# Patient Record
Sex: FEMALE
[De-identification: names, ages, dates, MRNs, and addresses within clinical notes are randomized per-mention and may not be internally consistent; named-entity substitution may affect disease eponyms.]

---

## 2020-09-17 ENCOUNTER — TRANSCRIPTION ENCOUNTER (OUTPATIENT)
Age: 20
End: 2020-09-17

## 2021-08-19 ENCOUNTER — APPOINTMENT (OUTPATIENT)
Dept: CARDIOLOGY | Facility: CLINIC | Age: 21
End: 2021-08-19

## 2021-08-19 DIAGNOSIS — K05.10 CHRONIC GINGIVITIS, PLAQUE INDUCED: ICD-10-CM

## 2021-08-19 DIAGNOSIS — R59.1 GENERALIZED ENLARGED LYMPH NODES: ICD-10-CM

## 2021-08-19 RX ORDER — AMOXICILLIN AND CLAVULANATE POTASSIUM 500; 125 MG/1; MG/1
500-125 TABLET, FILM COATED ORAL
Qty: 14 | Refills: 0 | Status: ACTIVE | COMMUNITY
Start: 2021-08-19 | End: 1900-01-01

## 2021-08-20 RX ORDER — CEPHALEXIN 500 MG/1
500 TABLET ORAL 3 TIMES DAILY
Qty: 15 | Refills: 0 | Status: ACTIVE | COMMUNITY
Start: 2021-08-20 | End: 1900-01-01

## 2021-09-01 ENCOUNTER — NON-APPOINTMENT (OUTPATIENT)
Age: 21
End: 2021-09-01

## 2021-09-01 ENCOUNTER — APPOINTMENT (OUTPATIENT)
Dept: OPHTHALMOLOGY | Facility: CLINIC | Age: 21
End: 2021-09-01
Payer: MEDICAID

## 2021-09-01 PROCEDURE — 92004 COMPRE OPH EXAM NEW PT 1/>: CPT

## 2021-11-03 DIAGNOSIS — N39.0 URINARY TRACT INFECTION, SITE NOT SPECIFIED: ICD-10-CM

## 2021-11-03 RX ORDER — NITROFURANTOIN (MONOHYDRATE/MACROCRYSTALS) 25; 75 MG/1; MG/1
100 CAPSULE ORAL TWICE DAILY
Qty: 14 | Refills: 0 | Status: ACTIVE | COMMUNITY
Start: 2021-11-03 | End: 1900-01-01

## 2022-08-22 ENCOUNTER — TRANSCRIPTION ENCOUNTER (OUTPATIENT)
Age: 22
End: 2022-08-22

## 2022-08-22 ENCOUNTER — LABORATORY RESULT (OUTPATIENT)
Age: 22
End: 2022-08-22

## 2022-08-22 ENCOUNTER — APPOINTMENT (OUTPATIENT)
Dept: CARDIOLOGY | Facility: CLINIC | Age: 22
End: 2022-08-22

## 2022-08-22 VITALS — HEART RATE: 110 BPM

## 2022-08-22 DIAGNOSIS — U07.1 COVID-19: ICD-10-CM

## 2022-08-22 DIAGNOSIS — R53.83 OTHER FATIGUE: ICD-10-CM

## 2022-08-22 DIAGNOSIS — R00.0 TACHYCARDIA, UNSPECIFIED: ICD-10-CM

## 2022-08-22 DIAGNOSIS — Z78.9 OTHER SPECIFIED HEALTH STATUS: ICD-10-CM

## 2022-08-22 DIAGNOSIS — R06.09 OTHER FORMS OF DYSPNEA: ICD-10-CM

## 2022-08-22 PROCEDURE — 36415 COLL VENOUS BLD VENIPUNCTURE: CPT

## 2022-08-22 PROCEDURE — 99204 OFFICE O/P NEW MOD 45 MIN: CPT | Mod: 25

## 2022-08-22 PROCEDURE — 93000 ELECTROCARDIOGRAM COMPLETE: CPT

## 2022-08-23 VITALS
TEMPERATURE: 99.2 F | WEIGHT: 160 LBS | DIASTOLIC BLOOD PRESSURE: 85 MMHG | OXYGEN SATURATION: 98 % | SYSTOLIC BLOOD PRESSURE: 116 MMHG | HEART RATE: 93 BPM

## 2022-08-23 PROBLEM — Z78.9 NEVER SMOKED TOBACCO: Status: ACTIVE | Noted: 2022-08-22

## 2022-08-23 PROBLEM — R53.83 FATIGUE: Status: ACTIVE | Noted: 2022-08-23

## 2022-08-23 LAB
ALBUMIN SERPL ELPH-MCNC: 5.1 G/DL
ALP BLD-CCNC: 73 U/L
ALT SERPL-CCNC: 17 U/L
ANION GAP SERPL CALC-SCNC: 14 MMOL/L
AST SERPL-CCNC: 17 U/L
BASOPHILS # BLD AUTO: 0.06 K/UL
BASOPHILS NFR BLD AUTO: 0.6 %
BILIRUB SERPL-MCNC: 0.2 MG/DL
BUN SERPL-MCNC: 8 MG/DL
CALCIUM SERPL-MCNC: 9.9 MG/DL
CHLORIDE SERPL-SCNC: 106 MMOL/L
CO2 SERPL-SCNC: 21 MMOL/L
CREAT SERPL-MCNC: 0.84 MG/DL
CRP SERPL-MCNC: <3 MG/L
EGFR: 100 ML/MIN/1.73M2
EOSINOPHIL # BLD AUTO: 0.11 K/UL
EOSINOPHIL NFR BLD AUTO: 1 %
ERYTHROCYTE [SEDIMENTATION RATE] IN BLOOD BY WESTERGREN METHOD: 34 MM/HR
GLUCOSE SERPL-MCNC: 112 MG/DL
HCT VFR BLD CALC: 41.1 %
HGB BLD-MCNC: 13.9 G/DL
IMM GRANULOCYTES NFR BLD AUTO: 0.6 %
LYMPHOCYTES # BLD AUTO: 3.71 K/UL
LYMPHOCYTES NFR BLD AUTO: 34.2 %
MAN DIFF?: NORMAL
MCHC RBC-ENTMCNC: 28.9 PG
MCHC RBC-ENTMCNC: 33.8 GM/DL
MCV RBC AUTO: 85.4 FL
MONOCYTES # BLD AUTO: 0.64 K/UL
MONOCYTES NFR BLD AUTO: 5.9 %
NEUTROPHILS # BLD AUTO: 6.26 K/UL
NEUTROPHILS NFR BLD AUTO: 57.7 %
NT-PROBNP SERPL-MCNC: 22 PG/ML
PLATELET # BLD AUTO: 354 K/UL
POTASSIUM SERPL-SCNC: 3.8 MMOL/L
PROT SERPL-MCNC: 8.2 G/DL
RBC # BLD: 4.81 M/UL
RBC # FLD: 13.2 %
SODIUM SERPL-SCNC: 142 MMOL/L
TSH SERPL-ACNC: 39.4 UIU/ML
WBC # FLD AUTO: 10.84 K/UL

## 2022-08-23 NOTE — HISTORY OF PRESENT ILLNESS
[FreeTextEntry1] : August 22, 2022.  21-year-old otherwise healthy woman who came down with COVID recently.  Symptoms started August 8, she tested + August 9, and symptoms resolved around August 13 or 14.  She has been back to work and doing fine but she has noticed that her heart rate is always a little fast and she is a little short of breath compared to pre-COVID.  Ectopy or presyncope.  No true limitation in terms of her shortness of breath.  No prior cardiac or pulmonary history.  Never smoked.  No family history of coronary artery disease.  She has mentioned that she has gained 30 pounds in the last year which she attributed to being on birth control pills

## 2022-08-23 NOTE — PHYSICAL EXAM
[Well Developed] : well developed [Well Nourished] : well nourished [No Acute Distress] : no acute distress [Normal Conjunctiva] : normal conjunctiva [Normal Venous Pressure] : normal venous pressure [No Carotid Bruit] : no carotid bruit [Normal S1, S2] : normal S1, S2 [No Murmur] : no murmur [No Rub] : no rub [No Gallop] : no gallop [Clear Lung Fields] : clear lung fields [Good Air Entry] : good air entry [No Respiratory Distress] : no respiratory distress  [Soft] : abdomen soft [Non Tender] : non-tender [No Masses/organomegaly] : no masses/organomegaly [Normal Bowel Sounds] : normal bowel sounds [Normal Gait] : normal gait [No Edema] : no edema [No Cyanosis] : no cyanosis [No Clubbing] : no clubbing [No Varicosities] : no varicosities [No Rash] : no rash [No Skin Lesions] : no skin lesions [Moves all extremities] : moves all extremities [No Focal Deficits] : no focal deficits [Normal Speech] : normal speech [Alert and Oriented] : alert and oriented [Normal memory] : normal memory [de-identified] : Diffusely enlarged thyroid with no nodules

## 2022-08-23 NOTE — REVIEW OF SYSTEMS
[Weight Gain (___ Lbs)] : [unfilled] ~Ulb weight gain [Dyspnea on exertion] : dyspnea during exertion [Chest Discomfort] : no chest discomfort [Lower Ext Edema] : no extremity edema [Palpitations] : palpitations [Orthopnea] : no orthopnea [PND] : no PND [Syncope] : no syncope [Cough] : no cough [Dizziness] : no dizziness [Depression] : no depression [Anxiety] : no anxiety [Under Stress] : not under stress [Negative] : Heme/Lymph

## 2022-08-23 NOTE — PHYSICAL EXAM
[Well Developed] : well developed [Well Nourished] : well nourished [No Acute Distress] : no acute distress [Normal Conjunctiva] : normal conjunctiva [Normal Venous Pressure] : normal venous pressure [No Carotid Bruit] : no carotid bruit [Normal S1, S2] : normal S1, S2 [No Murmur] : no murmur [No Rub] : no rub [No Gallop] : no gallop [Clear Lung Fields] : clear lung fields [Good Air Entry] : good air entry [No Respiratory Distress] : no respiratory distress  [Soft] : abdomen soft [Non Tender] : non-tender [No Masses/organomegaly] : no masses/organomegaly [Normal Bowel Sounds] : normal bowel sounds [Normal Gait] : normal gait [No Edema] : no edema [No Cyanosis] : no cyanosis [No Clubbing] : no clubbing [No Varicosities] : no varicosities [No Rash] : no rash [No Skin Lesions] : no skin lesions [Moves all extremities] : moves all extremities [No Focal Deficits] : no focal deficits [Normal Speech] : normal speech [Alert and Oriented] : alert and oriented [Normal memory] : normal memory [de-identified] : Diffusely enlarged thyroid with no nodules

## 2022-08-24 ENCOUNTER — NON-APPOINTMENT (OUTPATIENT)
Age: 22
End: 2022-08-24

## 2022-09-21 ENCOUNTER — LABORATORY RESULT (OUTPATIENT)
Age: 22
End: 2022-09-21

## 2022-09-22 LAB — TSH SERPL-ACNC: 4.49 UIU/ML

## 2022-09-30 ENCOUNTER — APPOINTMENT (OUTPATIENT)
Dept: OPHTHALMOLOGY | Facility: CLINIC | Age: 22
End: 2022-09-30

## 2022-09-30 ENCOUNTER — NON-APPOINTMENT (OUTPATIENT)
Age: 22
End: 2022-09-30

## 2022-09-30 PROCEDURE — 92014 COMPRE OPH EXAM EST PT 1/>: CPT

## 2022-10-26 ENCOUNTER — APPOINTMENT (OUTPATIENT)
Dept: ENDOCRINOLOGY | Facility: CLINIC | Age: 22
End: 2022-10-26

## 2022-10-26 VITALS
OXYGEN SATURATION: 99 % | DIASTOLIC BLOOD PRESSURE: 88 MMHG | HEART RATE: 117 BPM | TEMPERATURE: 98.1 F | SYSTOLIC BLOOD PRESSURE: 110 MMHG | BODY MASS INDEX: 29.81 KG/M2 | WEIGHT: 162 LBS | HEIGHT: 62 IN

## 2022-10-26 PROCEDURE — 99204 OFFICE O/P NEW MOD 45 MIN: CPT

## 2022-10-26 NOTE — HISTORY OF PRESENT ILLNESS
[FreeTextEntry1] : CHIEF COMPLAINT: Hypothyroidism\par \par REFERRED BY: Dr. Stewart Rivera \par \par Available prior medical records reviewed. \par \par HISTORY OF PRESENTING ILLNESS:\par The patient is a 21-year-old female here for evaluation of hypothyroidism.  She was first diagnosed with hypothyroidism after she had a COVID-19 infection in August 2022, and she started to notice symptoms of palpitations and a measurable elevated heart rate.  She was noted to have an elevated TSH of 39 and a low free T4 of 0.8, and was started on Synthroid 100 mcg daily.  Repeat levels from September 2022 have improved from prior.\par Currently she reports issues with weight gain, she has gained about 30 pounds over the last 2 years.  She occasionally still gets palpitations.  Feels that she has good energy, notices ongoing hair fall.  Has not lost any weight since starting levothyroxine.  Denies any violaceous striae on her abdomen, no facial plethora or supraclavicular fat pads.\par \par Hypo/Hyperthyroid symptoms: \par No constipation or diarrhea. +weight gain. No heat or cold intolerance. No fatigue.\par No palpitations. No tremors. No anxiety or depression. No mental slowing. \par No skin or hair changes. No facial or peripheral edema. \par \par Compressive symptoms: No neck swelling, no dysphagia, no dyspnea, no change in voice.\par Eye symptoms: No proptosis or eye swelling. No stare, no lid lag. \par \par No family history of thyroid disease or thyroid cancer. \par No history of biotin intake. \par No personal history of radiation exposure in the head and neck area. \par \par Menstrual History\par Menarche: At age 11\par She has had regular menstrual cycles until she started using Depo-Provera for contraception.  She recently stopped using Depo, and had a very light period earlier this month.  She is currently sexually active and will not be using any contraception.  She is not particularly planning pregnancy, but is not opposed to it.\par Not taking any iron or calcium supplements. 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 \par \par PERTINENT LABS: \par \par 9/21/2022\par TSH 4.49\par Free T4 1.7\par \par 8/22/2020\par TSH 39.4\par Free T4 0.8\par Creatinine 0.84\par eGFR 100\par

## 2022-10-26 NOTE — ASSESSMENT
[FreeTextEntry1] : 1. Hypothyroidism\par - Continue Levothyroxine 100 mcg daily \par - we discussed the importance of medication hygiene:\par           Take levothyroxine with water on an empty stomach, at least 30 minutes before any food/beverages or other medication intake. \par           Space any iron or calcium containing supplements by at least 4 hours after/before levothyroxine intake. \par - repeat TSH and free T4 in 6 weeks, check antithyroid antibodies, 25-OH vitamin D\par \par 2. Weight gain \par - check A1c, fasting lipid panel\par - if no weight loss with sustained improvement in thyroid function by next visit, can consider screening for Cushings though she does not have any specific signs \par \par RTC in 6 months\par \par Rosey Grover MD\par [facility]\par Endocrinology at [place] \par [address] [place], [address]\par Ph: [phone]\par Fax: [phone]\par

## 2022-11-18 LAB
25(OH)D3 SERPL-MCNC: 15.3 NG/ML
CHOLEST SERPL-MCNC: 164 MG/DL
ESTIMATED AVERAGE GLUCOSE: 108 MG/DL
HBA1C MFR BLD HPLC: 5.4 %
HDLC SERPL-MCNC: 44 MG/DL
LDLC SERPL CALC-MCNC: 103 MG/DL
NONHDLC SERPL-MCNC: 120 MG/DL
T4 FREE SERPL-MCNC: 1.9 NG/DL
THYROGLOB AB SERPL-ACNC: <20 IU/ML
THYROPEROXIDASE AB SERPL IA-ACNC: 482 IU/ML
TRIGL SERPL-MCNC: 83 MG/DL
TSH SERPL-ACNC: 2.02 UIU/ML

## 2022-11-21 ENCOUNTER — NON-APPOINTMENT (OUTPATIENT)
Age: 22
End: 2022-11-21

## 2023-03-05 ENCOUNTER — RX RENEWAL (OUTPATIENT)
Age: 23
End: 2023-03-05

## 2023-03-24 DIAGNOSIS — H10.9 UNSPECIFIED CONJUNCTIVITIS: ICD-10-CM

## 2023-03-24 RX ORDER — TOBRAMYCIN AND DEXAMETHASONE 3; 1 MG/ML; MG/ML
0.3-0.1 SUSPENSION/ DROPS OPHTHALMIC 3 TIMES DAILY
Qty: 1 | Refills: 3 | Status: ACTIVE | COMMUNITY
Start: 2023-03-24 | End: 1900-01-01

## 2023-04-26 DIAGNOSIS — Z00.00 ENCOUNTER FOR GENERAL ADULT MEDICAL EXAMINATION W/OUT ABNORMAL FINDINGS: ICD-10-CM

## 2023-04-27 ENCOUNTER — APPOINTMENT (OUTPATIENT)
Dept: ENDOCRINOLOGY | Facility: CLINIC | Age: 23
End: 2023-04-27
Payer: MEDICAID

## 2023-04-27 VITALS
RESPIRATION RATE: 14 BRPM | DIASTOLIC BLOOD PRESSURE: 70 MMHG | BODY MASS INDEX: 30.36 KG/M2 | HEIGHT: 62 IN | WEIGHT: 165 LBS | OXYGEN SATURATION: 99 % | HEART RATE: 100 BPM | SYSTOLIC BLOOD PRESSURE: 112 MMHG

## 2023-04-27 DIAGNOSIS — R63.5 ABNORMAL WEIGHT GAIN: ICD-10-CM

## 2023-04-27 LAB
ALBUMIN SERPL ELPH-MCNC: 5 G/DL
ALP BLD-CCNC: 68 U/L
ALT SERPL-CCNC: 26 U/L
ANION GAP SERPL CALC-SCNC: 15 MMOL/L
AST SERPL-CCNC: 17 U/L
BASOPHILS # BLD AUTO: 0.04 K/UL
BASOPHILS NFR BLD AUTO: 0.6 %
BILIRUB SERPL-MCNC: 0.3 MG/DL
BUN SERPL-MCNC: 13 MG/DL
CALCIUM SERPL-MCNC: 9.9 MG/DL
CHLORIDE SERPL-SCNC: 102 MMOL/L
CHOLEST SERPL-MCNC: 182 MG/DL
CO2 SERPL-SCNC: 20 MMOL/L
CREAT SERPL-MCNC: 0.69 MG/DL
EGFR: 124 ML/MIN/1.73M2
EOSINOPHIL # BLD AUTO: 0.15 K/UL
EOSINOPHIL NFR BLD AUTO: 2.1 %
ESTIMATED AVERAGE GLUCOSE: 108 MG/DL
HBA1C MFR BLD HPLC: 5.4 %
HCT VFR BLD CALC: 44.1 %
HDLC SERPL-MCNC: 45 MG/DL
HGB BLD-MCNC: 14.3 G/DL
IMM GRANULOCYTES NFR BLD AUTO: 0.1 %
LDLC SERPL CALC-MCNC: 124 MG/DL
LYMPHOCYTES # BLD AUTO: 2.8 K/UL
LYMPHOCYTES NFR BLD AUTO: 39.3 %
MAN DIFF?: NORMAL
MCHC RBC-ENTMCNC: 28.1 PG
MCHC RBC-ENTMCNC: 32.4 GM/DL
MCV RBC AUTO: 86.8 FL
MONOCYTES # BLD AUTO: 0.77 K/UL
MONOCYTES NFR BLD AUTO: 10.8 %
NEUTROPHILS # BLD AUTO: 3.35 K/UL
NEUTROPHILS NFR BLD AUTO: 47.1 %
NONHDLC SERPL-MCNC: 138 MG/DL
PLATELET # BLD AUTO: 333 K/UL
POTASSIUM SERPL-SCNC: 4.4 MMOL/L
PROT SERPL-MCNC: 8.1 G/DL
RBC # BLD: 5.08 M/UL
RBC # FLD: 13.1 %
SODIUM SERPL-SCNC: 137 MMOL/L
T4 SERPL-MCNC: 9.1 UG/DL
TRIGL SERPL-MCNC: 69 MG/DL
TSH SERPL-ACNC: 3 UIU/ML
WBC # FLD AUTO: 7.12 K/UL

## 2023-04-27 PROCEDURE — 99214 OFFICE O/P EST MOD 30 MIN: CPT

## 2023-04-27 RX ORDER — LEVOTHYROXINE SODIUM 0.1 MG/1
100 TABLET ORAL DAILY
Qty: 90 | Refills: 3 | Status: ACTIVE | COMMUNITY
Start: 2022-08-23 | End: 1900-01-01

## 2023-04-27 RX ORDER — DEXAMETHASONE 1 MG/1
1 TABLET ORAL
Qty: 1 | Refills: 0 | Status: ACTIVE | COMMUNITY
Start: 2023-04-27 | End: 1900-01-01

## 2023-04-27 NOTE — HISTORY OF PRESENT ILLNESS
[FreeTextEntry1] : CHIEF COMPLAINT: Hypothyroidism\par \par HISTORY OF PRESENTING ILLNESS:\par The patient is a 22-year-old female here for evaluation of hypothyroidism.  She was first diagnosed with hypothyroidism after she had a COVID-19 infection in August 2022, and she started to notice symptoms of palpitations and a measurable elevated heart rate.  She was noted to have an elevated TSH of 39 and a low free T4 of 0.8, and was started on Synthroid 100 mcg daily.  \par \par Currently on LT4 100 mcg daily, taking appropriately and not skipping any doses. \par \par She reports issues with weight gain, she has gained about 30 pounds over the last 2-3 years. Feels that she has good energy, notices ongoing hair fall.  Has not lost any weight since starting levothyroxine.  Denies any violaceous striae on her abdomen, no facial plethora or supraclavicular fat pads.\par \par Hypo/Hyperthyroid symptoms: \par No constipation or diarrhea. +weight gain. No heat or cold intolerance. No fatigue.\par No palpitations. No tremors. No anxiety or depression. No mental slowing. \par No skin or hair changes. No facial or peripheral edema. \par Compressive symptoms: No neck swelling, no dysphagia, no dyspnea, no change in voice.\par Eye symptoms: No proptosis or eye swelling. No stare, no lid lag. \par No family history of thyroid disease or thyroid cancer. \par No history of biotin intake. No personal history of radiation exposure in the head and neck area. \par \par Menstrual History\par Menarche: At age 11\par She is currently sexually active and will not be using any contraception.  She is not particularly planning pregnancy, but is not opposed to it.\par Not taking any iron or calcium supplements. 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 \par \par 4/26/23: TSH 3, Total T4 9.1\par

## 2023-04-27 NOTE — ASSESSMENT
[FreeTextEntry1] : 1. Hypothyroidism 2/2 Hashimoto's thyroiditis \par TPO +ve (11/2022)\par - Continue Levothyroxine 100 mcg daily \par - we discussed the importance of medication hygiene:\par           Take levothyroxine with water on an empty stomach, at least 30 minutes before any food/beverages or other medication intake. \par           Space any iron or calcium containing supplements by at least 4 hours after/before levothyroxine intake. \par - repeat TSH and free T4 every 6-12 months \par \par 2. Vitamin D deficiency \par 25OH vitamin D 15.3 from 11/2022, has not been taking supplements. \par - Start OTC vitamin D supplements 2000 IU daily, repeat level at next visit \par \par 3. Weight gain \par No violaceous striae or signs of Cushings'. \par - check 1 mg DST, instructions provided (not on OCPs currently, has normal diurnal rhythm)\par \par RTC in 6 months\par \par Rosey Grover MD\par [facility]\par Endocrinology at [place] \par [address], [address]\par Ph: [phone]\par Fax: [phone]\par

## 2023-05-15 ENCOUNTER — NON-APPOINTMENT (OUTPATIENT)
Age: 23
End: 2023-05-15

## 2023-05-15 LAB — CORTIS SERPL-MCNC: 0.6 UG/DL

## 2023-05-24 LAB — DEXAMETHASONE LEVEL: 425 NG/DL

## 2023-10-27 ENCOUNTER — LABORATORY RESULT (OUTPATIENT)
Age: 23
End: 2023-10-27

## 2023-10-28 LAB
25(OH)D3 SERPL-MCNC: 19.4 NG/ML
TSH SERPL-ACNC: 6.92 UIU/ML

## 2023-10-30 ENCOUNTER — APPOINTMENT (OUTPATIENT)
Dept: ENDOCRINOLOGY | Facility: CLINIC | Age: 23
End: 2023-10-30
Payer: COMMERCIAL

## 2023-10-30 VITALS
HEIGHT: 62 IN | WEIGHT: 155 LBS | DIASTOLIC BLOOD PRESSURE: 84 MMHG | OXYGEN SATURATION: 99 % | BODY MASS INDEX: 28.52 KG/M2 | HEART RATE: 97 BPM | SYSTOLIC BLOOD PRESSURE: 114 MMHG

## 2023-10-30 DIAGNOSIS — E55.9 VITAMIN D DEFICIENCY, UNSPECIFIED: ICD-10-CM

## 2023-10-30 DIAGNOSIS — E03.9 HYPOTHYROIDISM, UNSPECIFIED: ICD-10-CM

## 2023-10-30 PROCEDURE — 99214 OFFICE O/P EST MOD 30 MIN: CPT

## 2023-11-22 ENCOUNTER — APPOINTMENT (OUTPATIENT)
Dept: OTOLARYNGOLOGY | Facility: CLINIC | Age: 23
End: 2023-11-22
Payer: SELF-PAY

## 2023-11-22 PROCEDURE — 11950 SUBQ NJX FILLING MATRL 1CC/<: CPT

## 2023-12-05 ENCOUNTER — APPOINTMENT (OUTPATIENT)
Dept: OTOLARYNGOLOGY | Facility: CLINIC | Age: 23
End: 2023-12-05
Payer: SELF-PAY

## 2023-12-05 PROCEDURE — 11950J JUVEDERM: CUSTOM | Mod: NC

## 2023-12-28 DIAGNOSIS — L29.9 PRURITUS, UNSPECIFIED: ICD-10-CM

## 2023-12-28 RX ORDER — METHYLPREDNISOLONE 4 MG/1
4 TABLET ORAL
Qty: 1 | Refills: 0 | Status: ACTIVE | COMMUNITY
Start: 2023-12-28 | End: 1900-01-01

## 2024-01-24 ENCOUNTER — APPOINTMENT (OUTPATIENT)
Dept: OTOLARYNGOLOGY | Facility: CLINIC | Age: 24
End: 2024-01-24
Payer: SELF-PAY

## 2024-01-24 PROCEDURE — 11950 SUBQ NJX FILLING MATRL 1CC/<: CPT

## 2024-01-24 NOTE — END OF VISIT
[FreeTextEntry3] : I personally saw and examined GABRIEL MALIK in detail.  I spoke to OCHOA Roldan regarding the assessment and plan of care. I performed the procedures and relevant physical exam.  I have reviewed the above assessment and plan of care and I agree.  I have made changes to the body of the note wherever necessary and appropriate.

## 2024-03-22 DIAGNOSIS — J02.9 ACUTE PHARYNGITIS, UNSPECIFIED: ICD-10-CM

## 2024-03-23 ENCOUNTER — TRANSCRIPTION ENCOUNTER (OUTPATIENT)
Age: 24
End: 2024-03-23

## 2024-03-23 LAB
INFLUENZA A RESULT: NOT DETECTED
INFLUENZA B RESULT: NOT DETECTED
RESP SYN VIRUS RESULT: NOT DETECTED
S PYO DNA THROAT QL NAA+PROBE: NOT DETECTED
SARS-COV-2 RESULT: NOT DETECTED

## 2024-03-26 ENCOUNTER — NON-APPOINTMENT (OUTPATIENT)
Age: 24
End: 2024-03-26

## 2024-03-26 DIAGNOSIS — R05.1 ACUTE COUGH: ICD-10-CM

## 2024-03-26 RX ORDER — METHYLPREDNISOLONE 4 MG/1
4 TABLET ORAL
Qty: 1 | Refills: 0 | Status: ACTIVE | COMMUNITY
Start: 2024-03-26 | End: 1900-01-01

## 2024-06-06 ENCOUNTER — APPOINTMENT (OUTPATIENT)
Dept: ENDOCRINOLOGY | Facility: CLINIC | Age: 24
End: 2024-06-06

## 2024-06-26 ENCOUNTER — APPOINTMENT (OUTPATIENT)
Dept: OTOLARYNGOLOGY | Facility: CLINIC | Age: 24
End: 2024-06-26
Payer: SELF-PAY

## 2024-06-26 PROCEDURE — D0137: CPT | Mod: NC

## 2024-12-17 ENCOUNTER — APPOINTMENT (OUTPATIENT)
Dept: OTOLARYNGOLOGY | Facility: CLINIC | Age: 24
End: 2024-12-17

## 2025-01-23 ENCOUNTER — NON-APPOINTMENT (OUTPATIENT)
Age: 25
End: 2025-01-23

## 2025-01-24 ENCOUNTER — APPOINTMENT (OUTPATIENT)
Dept: OBGYN | Facility: CLINIC | Age: 25
End: 2025-01-24
Payer: COMMERCIAL

## 2025-01-24 DIAGNOSIS — Z13.1 ENCOUNTER FOR SCREENING FOR DIABETES MELLITUS: ICD-10-CM

## 2025-01-24 DIAGNOSIS — Z34.00 ENCOUNTER FOR SUPERVISION OF NORMAL FIRST PREGNANCY, UNSPECIFIED TRIMESTER: ICD-10-CM

## 2025-01-24 DIAGNOSIS — O99.280 ENDOCRINE, NUTRITIONAL AND METABOLIC DISEASES COMPLICATING PREGNANCY, UNSPECIFIED TRIMESTER: ICD-10-CM

## 2025-01-24 DIAGNOSIS — O21.1 HYPEREMESIS GRAVIDARUM WITH METABOLIC DISTURBANCE: ICD-10-CM

## 2025-01-24 DIAGNOSIS — E03.9 ENDOCRINE, NUTRITIONAL AND METABOLIC DISEASES COMPLICATING PREGNANCY, UNSPECIFIED TRIMESTER: ICD-10-CM

## 2025-01-24 PROCEDURE — 36415 COLL VENOUS BLD VENIPUNCTURE: CPT

## 2025-01-24 PROCEDURE — 0500F INITIAL PRENATAL CARE VISIT: CPT

## 2025-01-24 RX ORDER — .BETA.-CAROTENE, ASCORBIC ACID, CHOLECALCIFEROL, .ALPHA.-TOCOPHEROL ACETATE, DL-, THIAMINE MONONITRATE, RIBOFLAVIN, NIACINAMIDE, PYRIDOXINE HYDROCHLORIDE, FOLIC ACID, CYANOCOBALAMIN, CALCIUM PANTOTHENATE, CALCIUM CARBONATE, FERROUS FUMARATE, ZINC OXIDE, AND DOCUSATE SODIUM 1000; 100; 400; 30; 3; 3; 15; 20; 1; 12; 7; 200; 29; 20; 25 [IU]/1; MG/1; [IU]/1; [IU]/1; MG/1; MG/1; MG/1; MG/1; MG/1; UG/1; MG/1; MG/1; MG/1; MG/1; MG/1
29-1 TABLET, COATED ORAL DAILY
Qty: 90 | Refills: 3 | Status: ACTIVE | COMMUNITY
Start: 2025-01-24 | End: 1900-01-01

## 2025-01-24 RX ORDER — DOXYLAMINE SUCCINATE AND PYRIDOXINE HYDROCHLORIDE 10; 10 MG/1; MG/1
10-10 TABLET, DELAYED RELEASE ORAL 3 TIMES DAILY
Qty: 90 | Refills: 1 | Status: ACTIVE | COMMUNITY
Start: 2025-01-24 | End: 1900-01-01

## 2025-01-24 RX ORDER — ONDANSETRON 4 MG/1
4 TABLET, ORALLY DISINTEGRATING ORAL EVERY 6 HOURS
Qty: 45 | Refills: 3 | Status: ACTIVE | COMMUNITY
Start: 2025-01-24 | End: 1900-01-01

## 2025-01-26 PROBLEM — E55.9 VITAMIN D DEFICIENCY DISEASE: Status: ACTIVE | Noted: 2025-01-26

## 2025-01-26 LAB
25(OH)D3 SERPL-MCNC: 22.6 NG/ML
BACTERIA UR CULT: NORMAL
BASOPHILS # BLD AUTO: 0.03 K/UL
BASOPHILS NFR BLD AUTO: 0.3 %
EOSINOPHIL # BLD AUTO: 0.13 K/UL
EOSINOPHIL NFR BLD AUTO: 1.2 %
ESTIMATED AVERAGE GLUCOSE: 103 MG/DL
HBA1C MFR BLD HPLC: 5.2 %
HBV CORE IGG+IGM SER QL: NONREACTIVE
HBV SURFACE AB SER QL: REACTIVE
HBV SURFACE AG SER QL: NONREACTIVE
HCT VFR BLD CALC: 37.2 %
HCV AB SER QL: NONREACTIVE
HCV S/CO RATIO: 0.09 S/CO
HGB A MFR BLD: 97.8 %
HGB A2 MFR BLD: 2.2 %
HGB BLD-MCNC: 12.7 G/DL
HGB FRACT BLD-IMP: NORMAL
HIV1+2 AB SPEC QL IA.RAPID: NONREACTIVE
IMM GRANULOCYTES NFR BLD AUTO: 0.7 %
LEAD BLD-MCNC: <1 UG/DL
LYMPHOCYTES # BLD AUTO: 2.82 K/UL
LYMPHOCYTES NFR BLD AUTO: 26.6 %
MAN DIFF?: NORMAL
MCHC RBC-ENTMCNC: 29.3 PG
MCHC RBC-ENTMCNC: 34.1 G/DL
MCV RBC AUTO: 85.7 FL
MONOCYTES # BLD AUTO: 0.88 K/UL
MONOCYTES NFR BLD AUTO: 8.3 %
NEUTROPHILS # BLD AUTO: 6.68 K/UL
NEUTROPHILS NFR BLD AUTO: 62.9 %
PLATELET # BLD AUTO: 312 K/UL
RBC # BLD: 4.34 M/UL
RBC # FLD: 13.3 %
RUBV IGG FLD-ACNC: 3.22 INDEX
RUBV IGG SER-IMP: POSITIVE
T PALLIDUM AB SER QL IA: NEGATIVE
TSH SERPL-ACNC: 6.8 UIU/ML
VZV AB TITR SER: POSITIVE
VZV IGG SER IF-ACNC: 1.25 S/CO
WBC # FLD AUTO: 10.61 K/UL

## 2025-01-26 RX ORDER — LEVOTHYROXINE SODIUM 0.1 MG/1
100 TABLET ORAL
Qty: 90 | Refills: 2 | Status: COMPLETED | COMMUNITY
Start: 2025-01-24 | End: 2025-01-26

## 2025-01-28 ENCOUNTER — NON-APPOINTMENT (OUTPATIENT)
Age: 25
End: 2025-01-28

## 2025-01-28 LAB
ABO + RH PNL BLD: NORMAL
BLD GP AB SCN SERPL QL: NORMAL
BV BACTERIA RRNA VAG QL NAA+PROBE: NOT DETECTED
C GLABRATA RNA VAG QL NAA+PROBE: NOT DETECTED
C TRACH RRNA SPEC QL NAA+PROBE: NOT DETECTED
CANDIDA RRNA VAG QL PROBE: NOT DETECTED
N GONORRHOEA RRNA SPEC QL NAA+PROBE: NOT DETECTED
T VAGINALIS RRNA SPEC QL NAA+PROBE: NOT DETECTED

## 2025-01-29 LAB
B19V IGG SER QL IA: 0.25 INDEX
B19V IGG+IGM SER-IMP: NEGATIVE
B19V IGG+IGM SER-IMP: NORMAL
B19V IGM FLD-ACNC: 0.16 INDEX
B19V IGM SER-ACNC: NEGATIVE

## 2025-02-06 ENCOUNTER — NON-APPOINTMENT (OUTPATIENT)
Age: 25
End: 2025-02-06

## 2025-02-06 ENCOUNTER — APPOINTMENT (OUTPATIENT)
Dept: OBGYN | Facility: CLINIC | Age: 25
End: 2025-02-06
Payer: COMMERCIAL

## 2025-02-06 PROCEDURE — 36415 COLL VENOUS BLD VENIPUNCTURE: CPT

## 2025-02-06 PROCEDURE — 0502F SUBSEQUENT PRENATAL CARE: CPT

## 2025-02-07 ENCOUNTER — TRANSCRIPTION ENCOUNTER (OUTPATIENT)
Age: 25
End: 2025-02-07

## 2025-02-07 LAB
AFP INTERP SERPL-IMP: NORMAL
AFP INTERP SERPL-IMP: NORMAL
AFP MOM CUT-OFF: 2.5
AFP MOM SERPL: 1.06
AFP PERCENTILE: 56.7
AFP SERPL-ACNC: 33.76 NG/ML
CARBAMAZEPINE?: NO
CURRENT SMOKER: NORMAL
DIABETES STATUS PATIENT: NORMAL
GA: NORMAL
GESTATIONAL AGE METHOD: NORMAL
HX OF NTD NARR: NORMAL
MULTIPLE PREGNANCY: NORMAL
NEURAL TUBE DEFECT RISK FETUS: NORMAL
NEURAL TUBE DEFECT RISK POP: NORMAL
RECOM F/U: NO
TEST PERFORMANCE INFO SPEC: NORMAL
VALPROIC ACID?: NORMAL

## 2025-02-27 ENCOUNTER — ASOB RESULT (OUTPATIENT)
Age: 25
End: 2025-02-27

## 2025-02-27 ENCOUNTER — APPOINTMENT (OUTPATIENT)
Dept: ANTEPARTUM | Facility: CLINIC | Age: 25
End: 2025-02-27
Payer: COMMERCIAL

## 2025-02-27 PROCEDURE — 76811 OB US DETAILED SNGL FETUS: CPT

## 2025-03-13 ENCOUNTER — NON-APPOINTMENT (OUTPATIENT)
Age: 25
End: 2025-03-13

## 2025-03-13 ENCOUNTER — APPOINTMENT (OUTPATIENT)
Dept: OBGYN | Facility: CLINIC | Age: 25
End: 2025-03-13
Payer: COMMERCIAL

## 2025-03-13 PROCEDURE — 0502F SUBSEQUENT PRENATAL CARE: CPT

## 2025-04-17 ENCOUNTER — APPOINTMENT (OUTPATIENT)
Dept: OBGYN | Facility: CLINIC | Age: 25
End: 2025-04-17
Payer: COMMERCIAL

## 2025-04-17 PROCEDURE — 36415 COLL VENOUS BLD VENIPUNCTURE: CPT

## 2025-04-17 PROCEDURE — 0502F SUBSEQUENT PRENATAL CARE: CPT

## 2025-04-18 LAB
25(OH)D3 SERPL-MCNC: 30.6 NG/ML
ANTIBODY SCREEN: NORMAL
BASOPHILS # BLD AUTO: 0.04 K/UL
BASOPHILS NFR BLD AUTO: 0.4 %
EOSINOPHIL # BLD AUTO: 0.1 K/UL
EOSINOPHIL NFR BLD AUTO: 1 %
GLUCOSE 1H P 50 G GLC PO SERPL-MCNC: 117 MG/DL
HCT VFR BLD CALC: 38.4 %
HGB BLD-MCNC: 12.5 G/DL
IMM GRANULOCYTES NFR BLD AUTO: 1.6 %
LYMPHOCYTES # BLD AUTO: 2.13 K/UL
LYMPHOCYTES NFR BLD AUTO: 20.3 %
MAN DIFF?: NORMAL
MCHC RBC-ENTMCNC: 29.4 PG
MCHC RBC-ENTMCNC: 32.6 G/DL
MCV RBC AUTO: 90.4 FL
MONOCYTES # BLD AUTO: 0.77 K/UL
MONOCYTES NFR BLD AUTO: 7.3 %
NEUTROPHILS # BLD AUTO: 7.28 K/UL
NEUTROPHILS NFR BLD AUTO: 69.4 %
PLATELET # BLD AUTO: 295 K/UL
RBC # BLD: 4.25 M/UL
RBC # FLD: 14.3 %
WBC # FLD AUTO: 10.49 K/UL

## 2025-05-15 ENCOUNTER — NON-APPOINTMENT (OUTPATIENT)
Age: 25
End: 2025-05-15

## 2025-05-15 ENCOUNTER — APPOINTMENT (OUTPATIENT)
Dept: OBGYN | Facility: CLINIC | Age: 25
End: 2025-05-15
Payer: COMMERCIAL

## 2025-05-15 DIAGNOSIS — Z23 ENCOUNTER FOR IMMUNIZATION: ICD-10-CM

## 2025-05-15 PROCEDURE — 90715 TDAP VACCINE 7 YRS/> IM: CPT

## 2025-05-15 PROCEDURE — 90471 IMMUNIZATION ADMIN: CPT

## 2025-05-15 PROCEDURE — 0502F SUBSEQUENT PRENATAL CARE: CPT

## 2025-05-29 ENCOUNTER — NON-APPOINTMENT (OUTPATIENT)
Age: 25
End: 2025-05-29

## 2025-05-29 ENCOUNTER — APPOINTMENT (OUTPATIENT)
Dept: OBGYN | Facility: CLINIC | Age: 25
End: 2025-05-29
Payer: COMMERCIAL

## 2025-05-29 ENCOUNTER — ASOB RESULT (OUTPATIENT)
Age: 25
End: 2025-05-29

## 2025-05-29 DIAGNOSIS — Z34.00 ENCOUNTER FOR SUPERVISION OF NORMAL FIRST PREGNANCY, UNSPECIFIED TRIMESTER: ICD-10-CM

## 2025-05-29 PROCEDURE — 0502F SUBSEQUENT PRENATAL CARE: CPT

## 2025-05-29 PROCEDURE — 76816 OB US FOLLOW-UP PER FETUS: CPT

## 2025-05-29 PROCEDURE — 76819 FETAL BIOPHYS PROFIL W/O NST: CPT | Mod: 59

## 2025-06-12 ENCOUNTER — APPOINTMENT (OUTPATIENT)
Dept: OBGYN | Facility: CLINIC | Age: 25
End: 2025-06-12
Payer: COMMERCIAL

## 2025-06-12 PROCEDURE — 0502F SUBSEQUENT PRENATAL CARE: CPT

## 2025-06-25 ENCOUNTER — APPOINTMENT (OUTPATIENT)
Dept: OBGYN | Facility: CLINIC | Age: 25
End: 2025-06-25
Payer: COMMERCIAL

## 2025-06-25 PROCEDURE — 36415 COLL VENOUS BLD VENIPUNCTURE: CPT

## 2025-06-25 PROCEDURE — 0502F SUBSEQUENT PRENATAL CARE: CPT

## 2025-06-26 LAB
HIV1+2 AB SPEC QL IA.RAPID: NONREACTIVE
T PALLIDUM AB SER QL IA: NEGATIVE

## 2025-06-27 LAB
GP B STREP DNA SPEC QL NAA+PROBE: NOT DETECTED
SOURCE GBS: NORMAL

## 2025-06-30 ENCOUNTER — APPOINTMENT (OUTPATIENT)
Dept: ANTEPARTUM | Facility: HOSPITAL | Age: 25
End: 2025-06-30

## 2025-06-30 ENCOUNTER — APPOINTMENT (OUTPATIENT)
Dept: OBGYN | Facility: CLINIC | Age: 25
End: 2025-06-30
Payer: COMMERCIAL

## 2025-06-30 ENCOUNTER — INPATIENT (INPATIENT)
Facility: HOSPITAL | Age: 25
LOS: 2 days | Discharge: HOME | End: 2025-07-03
Attending: OBSTETRICS & GYNECOLOGY | Admitting: OBSTETRICS & GYNECOLOGY
Payer: COMMERCIAL

## 2025-06-30 VITALS
HEART RATE: 96 BPM | SYSTOLIC BLOOD PRESSURE: 125 MMHG | TEMPERATURE: 98 F | DIASTOLIC BLOOD PRESSURE: 82 MMHG | RESPIRATION RATE: 16 BRPM | OXYGEN SATURATION: 97 %

## 2025-06-30 DIAGNOSIS — Z34.80 ENCOUNTER FOR SUPERVISION OF OTHER NORMAL PREGNANCY, UNSPECIFIED TRIMESTER: ICD-10-CM

## 2025-06-30 DIAGNOSIS — O26.899 OTHER SPECIFIED PREGNANCY RELATED CONDITIONS, UNSPECIFIED TRIMESTER: ICD-10-CM

## 2025-06-30 LAB
ALBUMIN SERPL ELPH-MCNC: 3.6 G/DL — SIGNIFICANT CHANGE UP (ref 3.3–5)
ALP SERPL-CCNC: 174 U/L — HIGH (ref 40–120)
ALT FLD-CCNC: 31 U/L — SIGNIFICANT CHANGE UP (ref 10–45)
ANION GAP SERPL CALC-SCNC: 16 MMOL/L — SIGNIFICANT CHANGE UP (ref 5–17)
APPEARANCE UR: CLEAR — SIGNIFICANT CHANGE UP
AST SERPL-CCNC: 25 U/L — SIGNIFICANT CHANGE UP (ref 10–40)
BACTERIA # UR AUTO: ABNORMAL /HPF
BASOPHILS # BLD AUTO: 0.03 K/UL — SIGNIFICANT CHANGE UP (ref 0–0.2)
BASOPHILS NFR BLD AUTO: 0.3 % — SIGNIFICANT CHANGE UP (ref 0–2)
BILIRUB SERPL-MCNC: 0.3 MG/DL — SIGNIFICANT CHANGE UP (ref 0.2–1.2)
BILIRUB UR-MCNC: NEGATIVE — SIGNIFICANT CHANGE UP
BLD GP AB SCN SERPL QL: NEGATIVE — SIGNIFICANT CHANGE UP
BUN SERPL-MCNC: 6 MG/DL — LOW (ref 7–23)
CALCIUM SERPL-MCNC: 9.6 MG/DL — SIGNIFICANT CHANGE UP (ref 8.4–10.5)
CAST: 1 /LPF — SIGNIFICANT CHANGE UP (ref 0–4)
CHLORIDE SERPL-SCNC: 102 MMOL/L — SIGNIFICANT CHANGE UP (ref 96–108)
CO2 SERPL-SCNC: 18 MMOL/L — LOW (ref 22–31)
COLOR SPEC: YELLOW — SIGNIFICANT CHANGE UP
CREAT ?TM UR-MCNC: 67 MG/DL — SIGNIFICANT CHANGE UP
CREAT SERPL-MCNC: 0.44 MG/DL — LOW (ref 0.5–1.3)
DIFF PNL FLD: NEGATIVE — SIGNIFICANT CHANGE UP
EGFR: 137 ML/MIN/1.73M2 — SIGNIFICANT CHANGE UP
EGFR: 137 ML/MIN/1.73M2 — SIGNIFICANT CHANGE UP
EOSINOPHIL # BLD AUTO: 0.08 K/UL — SIGNIFICANT CHANGE UP (ref 0–0.5)
EOSINOPHIL NFR BLD AUTO: 0.8 % — SIGNIFICANT CHANGE UP (ref 0–6)
GLUCOSE SERPL-MCNC: 80 MG/DL — SIGNIFICANT CHANGE UP (ref 70–99)
GLUCOSE UR QL: NEGATIVE MG/DL — SIGNIFICANT CHANGE UP
HCT VFR BLD CALC: 40.8 % — SIGNIFICANT CHANGE UP (ref 34.5–45)
HGB BLD-MCNC: 13.6 G/DL — SIGNIFICANT CHANGE UP (ref 11.5–15.5)
IMM GRANULOCYTES # BLD AUTO: 0.15 K/UL — HIGH (ref 0–0.07)
IMM GRANULOCYTES NFR BLD AUTO: 1.6 % — HIGH (ref 0–0.9)
KETONES UR QL: NEGATIVE MG/DL — SIGNIFICANT CHANGE UP
LDH SERPL L TO P-CCNC: 172 U/L — SIGNIFICANT CHANGE UP (ref 50–242)
LEUKOCYTE ESTERASE UR-ACNC: ABNORMAL
LYMPHOCYTES # BLD AUTO: 2.22 K/UL — SIGNIFICANT CHANGE UP (ref 1–3.3)
LYMPHOCYTES NFR BLD AUTO: 23.2 % — SIGNIFICANT CHANGE UP (ref 13–44)
MCHC RBC-ENTMCNC: 29.6 PG — SIGNIFICANT CHANGE UP (ref 27–34)
MCHC RBC-ENTMCNC: 33.3 G/DL — SIGNIFICANT CHANGE UP (ref 32–36)
MCV RBC AUTO: 88.9 FL — SIGNIFICANT CHANGE UP (ref 80–100)
MONOCYTES # BLD AUTO: 0.71 K/UL — SIGNIFICANT CHANGE UP (ref 0–0.9)
MONOCYTES NFR BLD AUTO: 7.4 % — SIGNIFICANT CHANGE UP (ref 2–14)
NEUTROPHILS # BLD AUTO: 6.39 K/UL — SIGNIFICANT CHANGE UP (ref 1.8–7.4)
NEUTROPHILS NFR BLD AUTO: 66.7 % — SIGNIFICANT CHANGE UP (ref 43–77)
NITRITE UR-MCNC: NEGATIVE — SIGNIFICANT CHANGE UP
NRBC # BLD AUTO: 0 K/UL — SIGNIFICANT CHANGE UP (ref 0–0)
NRBC # FLD: 0 K/UL — SIGNIFICANT CHANGE UP (ref 0–0)
NRBC BLD AUTO-RTO: 0 /100 WBCS — SIGNIFICANT CHANGE UP (ref 0–0)
PH UR: 6 — SIGNIFICANT CHANGE UP (ref 5–8)
PLATELET # BLD AUTO: 231 K/UL — SIGNIFICANT CHANGE UP (ref 150–400)
PMV BLD: 10.5 FL — SIGNIFICANT CHANGE UP (ref 7–13)
POTASSIUM SERPL-MCNC: 3.8 MMOL/L — SIGNIFICANT CHANGE UP (ref 3.5–5.3)
POTASSIUM SERPL-SCNC: 3.8 MMOL/L — SIGNIFICANT CHANGE UP (ref 3.5–5.3)
PROT ?TM UR-MCNC: 16 MG/DL — HIGH (ref 0–12)
PROT SERPL-MCNC: 6.9 G/DL — SIGNIFICANT CHANGE UP (ref 6–8.3)
PROT UR-MCNC: NEGATIVE MG/DL — SIGNIFICANT CHANGE UP
PROT/CREAT UR-RTO: 0.2 RATIO — SIGNIFICANT CHANGE UP (ref 0–0.2)
RBC # BLD: 4.59 M/UL — SIGNIFICANT CHANGE UP (ref 3.8–5.2)
RBC # FLD: 15.2 % — HIGH (ref 10.3–14.5)
RBC CASTS # UR COMP ASSIST: 2 /HPF — SIGNIFICANT CHANGE UP (ref 0–4)
REVIEW: SIGNIFICANT CHANGE UP
RH IG SCN BLD-IMP: POSITIVE — SIGNIFICANT CHANGE UP
RH IG SCN BLD-IMP: POSITIVE — SIGNIFICANT CHANGE UP
SODIUM SERPL-SCNC: 136 MMOL/L — SIGNIFICANT CHANGE UP (ref 135–145)
SP GR SPEC: 1.01 — SIGNIFICANT CHANGE UP (ref 1–1.03)
SQUAMOUS # UR AUTO: 7 /HPF — HIGH (ref 0–5)
URATE SERPL-MCNC: 3.7 MG/DL — SIGNIFICANT CHANGE UP (ref 2.5–7)
UROBILINOGEN FLD QL: 0.2 MG/DL — SIGNIFICANT CHANGE UP (ref 0.2–1)
WBC # BLD: 9.58 K/UL — SIGNIFICANT CHANGE UP (ref 3.8–10.5)
WBC # FLD AUTO: 9.58 K/UL — SIGNIFICANT CHANGE UP (ref 3.8–10.5)
WBC UR QL: 29 /HPF — HIGH (ref 0–5)

## 2025-06-30 PROCEDURE — 85025 COMPLETE CBC W/AUTO DIFF WBC: CPT

## 2025-06-30 PROCEDURE — 82570 ASSAY OF URINE CREATININE: CPT

## 2025-06-30 PROCEDURE — 80053 COMPREHEN METABOLIC PANEL: CPT

## 2025-06-30 PROCEDURE — 84550 ASSAY OF BLOOD/URIC ACID: CPT

## 2025-06-30 PROCEDURE — 0502F SUBSEQUENT PRENATAL CARE: CPT

## 2025-06-30 PROCEDURE — 81001 URINALYSIS AUTO W/SCOPE: CPT

## 2025-06-30 PROCEDURE — 84156 ASSAY OF PROTEIN URINE: CPT

## 2025-06-30 PROCEDURE — 83615 LACTATE (LD) (LDH) ENZYME: CPT

## 2025-06-30 PROCEDURE — 59426 ANTEPARTUM CARE ONLY: CPT

## 2025-06-30 PROCEDURE — 86850 RBC ANTIBODY SCREEN: CPT

## 2025-06-30 PROCEDURE — 86900 BLOOD TYPING SEROLOGIC ABO: CPT

## 2025-06-30 PROCEDURE — ZZZZZ: CPT

## 2025-06-30 PROCEDURE — 86901 BLOOD TYPING SEROLOGIC RH(D): CPT

## 2025-06-30 RX ORDER — CITRIC ACID/SODIUM CITRATE 300-500 MG
15 SOLUTION, ORAL ORAL EVERY 6 HOURS
Refills: 0 | Status: DISCONTINUED | OUTPATIENT
Start: 2025-06-30 | End: 2025-07-02

## 2025-06-30 RX ORDER — SODIUM CHLORIDE 9 G/1000ML
1000 INJECTION, SOLUTION INTRAVENOUS
Refills: 0 | Status: DISCONTINUED | OUTPATIENT
Start: 2025-06-30 | End: 2025-07-02

## 2025-06-30 RX ORDER — LEVOTHYROXINE SODIUM 300 MCG
150 TABLET ORAL DAILY
Refills: 0 | Status: DISCONTINUED | OUTPATIENT
Start: 2025-06-30 | End: 2025-07-03

## 2025-06-30 RX ORDER — OXYTOCIN-SODIUM CHLORIDE 0.9% IV SOLN 30 UNIT/500ML 30-0.9/5 UT/ML-%
167 SOLUTION INTRAVENOUS
Qty: 30 | Refills: 0 | Status: DISCONTINUED | OUTPATIENT
Start: 2025-06-30 | End: 2025-07-03

## 2025-06-30 RX ADMIN — Medication 1 APPLICATION(S): at 18:48

## 2025-06-30 RX ADMIN — SODIUM CHLORIDE 125 MILLILITER(S): 9 INJECTION, SOLUTION INTRAVENOUS at 18:15

## 2025-06-30 RX ADMIN — SODIUM CHLORIDE 125 MILLILITER(S): 9 INJECTION, SOLUTION INTRAVENOUS at 19:52

## 2025-06-30 NOTE — OB PROVIDER H&P - ASSESSMENT
24 y.o. Female  EDC 25 IUP @ 37 wks. gest., (-) GBS, who presents from PMD office for evaluation of elevated BP (125/90, 137/91, 125/90) with h/o elevated BP on 25 @ /90 qualifying for Dx Gest. HTN with one isolated elevated BP on L&D=155/83 and HELLP labs WNL.    PLAN:  Admit to L&D            clear Liquid Diet, then NPO in labor            BR            EFM/TOCO            Anesthesia Consult            Routine labs            HELLP labs            IOL with Vaginal Cytotec, to attempt CB insertion in 3 hrs. & if successful to change to Buccal Cytotec.    DANIEL Valladares  D/W Dr. Marie 24 y.o. Female  EDC 25 IUP @ 37 wks. gest., (-) GBS, who presents from PMD office for evaluation of elevated BP (125/90, 137/91, 125/90) with h/o elevated BP on 25 @ /90 qualifying for Dx Gest. HTN with one isolated elevated BP on L&D=155/83 and HELLP labs WNL.  EFW 3100 gms.    SONO:  Vtx    PLAN:  Admit to L&D            clear Liquid Diet, then NPO in labor            BR            EFM/TOCO            Anesthesia Consult            Routine labs            HELLP labs            IOL with Vaginal Cytotec, to attempt CB insertion in 3 hrs. & if successful to change to Buccal Cytotec.    DANIEL Valladares  D/W Dr. Marie

## 2025-06-30 NOTE — OB PROVIDER IHI INDUCTION/AUGMENTATION NOTE - NS_OBIHITYPE_OBGYN_ALL_OB
Induction Home Suture Removal Text: Patient was provided instructions on removing sutures and will remove their sutures at home.  If they have any questions or difficulties they will call the office.

## 2025-06-30 NOTE — OB PROVIDER H&P - NSHPLABSRESULTS_GEN_ALL_CORE
13.6   9.58  )-----------( 231      ( 2025 16:33 )             40.8     Urinalysis Basic - ( 2025 16:33 )    Color: Yellow / Appearance: Clear / S.011 / pH: x  Gluc: x / Ketone: x  / Bili: Negative / Urobili: 0.2 mg/dL   Blood: x / Protein: Negative mg/dL / Nitrite: Negative   Leuk Esterase: Moderate / RBC: x / WBC x   Sq Epi: x / Non Sq Epi: x / Bacteria: x  P:C=0.2        136  |  102  |  6[L]  ----------------------------<  80  3.8   |  18[L]  |  0.44[L]    Ca    9.6      2025 16:33    TPro  6.9  /  Alb  3.6  /  TBili  0.3  /  DBili  x   /  AST  25  /  ALT  31  /  AlkPhos  174[H]

## 2025-06-30 NOTE — OB PROVIDER H&P - HISTORY OF PRESENT ILLNESS
24 y.o. Female  EDC 25 IUP @ 37 wks. gest., (-) GBS, who presents from PMD office for evaluation of elevated BP (125/90, 137/91, 125/90) with h/o elevated BP on 25 @ /90 qualifying for Dx Gest. HTN with one isolated elevated BP on L&D=155/83 and HELLP labs WNL.  Pt. denies HA/visual changes/epigastric distress or proteinuria.  (+) FM.  (+) cramping.  (-) Vaginal Bleeding/Fluid.  VE by Dr. Salinas @ [facility]:  0/0/-3  EFW 3100 gms.    POBHx:  Denies    PGynHx:  Denies fibroids, endometriosis, STD,, Abn Pap, Ovarian Cyst    PMHx:  Obesity; BMI=38.6             Hypothyroidism Dx'ed .  ENDO:  Dr. Grover    PSHx:  Denies    Meds:  PNV 1 p.o. daily             Levothyroxine 150 mcg. p.o. daily    NKDA    SocHx:  Denies smoking tobacco/ETOH/Illegal drug use    FHx:  Denies    SocHx:  Denies smoking tobacco/ETOH/Illegal drug use

## 2025-06-30 NOTE — OB PROVIDER TRIAGE NOTE - NSOBPROVIDERNOTE_OBGYN_ALL_OB_FT
24 y.o. Female  EDC 25 IUP @ 37 wks. gest., (-) GBS, who presents from PMD office for Gest. HTN dx'ed today for possible PEC.    PLAN:  VIVEK            EFM/CHRISTIANO            Serial BP's            HELLP labs    DANIEL Valladares    Addendum:  BP's:  122/76                             155/83                             101/58                             116/67                              118/74

## 2025-06-30 NOTE — OB RN PATIENT PROFILE - NS_ARRIVALFROM_OBGYN_ALL_OB
AMA (saw a physician/midlevel provider and clinician was able to provide reasons for staying for treatment & form is signed) Doctor's Office

## 2025-06-30 NOTE — OB RN PATIENT PROFILE - FALL HARM RISK - UNIVERSAL INTERVENTIONS
Bed in lowest position, wheels locked, appropriate side rails in place/Call bell, personal items and telephone in reach/Instruct patient to call for assistance before getting out of bed or chair/Non-slip footwear when patient is out of bed/[place] to call system/Physically safe environment - no spills, clutter or unnecessary equipment/Purposeful Proactive Rounding/Room/bathroom lighting operational, light cord in reach

## 2025-06-30 NOTE — OB PROVIDER TRIAGE NOTE - CURRENT PREGNANCY COMPLICATIONS, OB PROFILE
Answers for HPI/ROS submitted by the patient on 10/25/2020   Dysuria  Chronicity: chronic  Onset: yesterday  Frequency: every urination  Progression since onset: rapidly worsening  Pain quality: stabbing  Pain - numeric: 9/10  Fever: no fever  Sexually active?: Yes  History of pyelonephritis?: Yes  chills: Yes  discharge: No  flank pain: Yes  frequency: Yes  hesitancy: No  nausea: Yes  possible pregnancy: No  sweats: Yes  urgency: Yes  vomiting: No  constipation: No  withholding: Yes  Treatments tried: acetaminophen, antibiotics, home medications, sitz baths  Improvement on treatment: no relief  Pain severity: moderate  catheterization: No  diabetes insipidus: No  diabetes mellitus: No  genitourinary reflux: No  hypertension: No  recurrent UTIs: No  single kidney: No  STD: No  urinary stasis: No  urological procedure: Yes  kidney stones: Yes     None

## 2025-06-30 NOTE — OB RN PATIENT PROFILE - BREAST MILK SUPPORTS STABLE NEWBORN BLOOD SUGAR
Arterial Line:    An arterial line was placed using surface landmarks, in the OR for the following indication(s): continuous blood pressure monitoring and blood sampling needed. A 20 gauge (size), 1 and 3/4 inch (length), Angiocath (type) catheter was placed, Seldinger technique used, into the left radial artery, secured by tape. Anesthesia type: General    Events:  patient tolerated procedure well with no complications.   Anesthesiologist: Janene Bhatia MD  Resident/CRNA: CELE Mcdonnell CRNA  Performed: Resident/CRNA   Preanesthetic Checklist  Completed: patient identified, site marked, surgical consent, pre-op evaluation, timeout performed, IV checked, risks and benefits discussed, monitors and equipment checked, anesthesia consent given, oxygen available and patient being monitored Statement Selected

## 2025-06-30 NOTE — OB PROVIDER TRIAGE NOTE - NSHPLABSRESULTS_GEN_ALL_CORE
13.6   9.58  )-----------( 231      ( 2025 16:33 )             40.8     Urinalysis Basic - ( 2025 16:33 )    Color: Yellow / Appearance: Clear / S.011 / pH: x  Gluc: x / Ketone: x  / Bili: Negative / Urobili: 0.2 mg/dL   Blood: x / Protein: Negative mg/dL / Nitrite: Negative   Leuk Esterase: Moderate / RBC: x / WBC x   Sq Epi: x / Non Sq Epi: x / Bacteria: x

## 2025-06-30 NOTE — OB RN PATIENT PROFILE - NSICDXPASTSURGICALHX_GEN_ALL_CORE_FT
"Jim is a 46 year old who is being evaluated via a billable video visit.      Video Start Time: 10:44 AM    Assessment & Plan       ICD-10-CM    1. Anxiety state  F41.1 venlafaxine (EFFEXOR-ER) 225 MG 24 hr tablet     LORazepam (ATIVAN) 0.5 MG tablet     Sx are overall well controlled with his current medications.  Typically 20 lorazepam lasts for 1 year.   Refilled both rx today.  Plan f/u this fall for CPE.     Good Mooney MD  [facility]    Subjective   Jim is a 46 year old who presents for the following health issues     HPI     Anxiety disorder.  \"The psychiatric nurse I've  been seeing for many years has retired and I'm hoping you can manage my prescriptions for anxiety/panic.  I've been stable on the same medications for many years.     I'm currently taking:  - Venlafaxine ER 225mg once a day  - Lorazepam 0.5mg as needed (usually 1 or 2 a month)\"     He feels his mood is well managed with his current prescriptions. Would like to CPM.  Lorazepam - typically 20 last for 1 year.        Objective           Vitals:  No vitals were obtained today due to virtual visit.    Physical Exam   GEN: No distress  SKIN: No visible rashes  PSYCH: Normal affect. Well groomed.           Video-Visit Details    Type of service:  Video Visit    Video End Time:10:50 AM    Originating Location (pt. Location): Home    Distant Location (provider location):  [facility]     Platform used for Video Visit: Arvind"
PAST SURGICAL HISTORY:  No significant past surgical history

## 2025-06-30 NOTE — OB PROVIDER TRIAGE NOTE - HISTORY OF PRESENT ILLNESS
24 y.o. Female  EDC 25 IUP @ 37 wks. gest., (-) GBS, who presents from PMD office for evaluation of elevated BP (125/90, 137/91, 125/90) with h/o elevated BP on 25 @ /90.  Pt. denies HA/visual changes/epigastric distress or proteinuria.  (+) FM.  (+) cramping.  (-) Vaginal Bleeding/Fluid.  VE by Dr. Salinas @ [facility]:  0/0/-3  EFW 3100 gms.    POBHx:  Denies    PGynHx:  Denies fibroids, endometriosis, STD,, Abn Pap, Ovarian Cyst    PMHx:  Obesity             Hypothyroidism Dx'ed .  ENDO:  Dr. Grover    PSHx:  Denies    Meds:  PNV 1 p.o. daily             Levothyroxine 150 mcg. p.o. daily    NKDA    SocHx:  Denies smoking tobacco/ETOH/Illegal drug use    FHx:  Denies    SocHx:  Denies smoking tobacco/ETOH/Illegal drug use

## 2025-07-01 LAB — T PALLIDUM AB TITR SER: NEGATIVE — SIGNIFICANT CHANGE UP

## 2025-07-01 PROCEDURE — 86901 BLOOD TYPING SEROLOGIC RH(D): CPT

## 2025-07-01 PROCEDURE — 59050 FETAL MONITOR W/REPORT: CPT

## 2025-07-01 PROCEDURE — 86780 TREPONEMA PALLIDUM: CPT

## 2025-07-01 PROCEDURE — 59410 OBSTETRICAL CARE: CPT

## 2025-07-01 PROCEDURE — 85025 COMPLETE CBC W/AUTO DIFF WBC: CPT

## 2025-07-01 PROCEDURE — 80053 COMPREHEN METABOLIC PANEL: CPT

## 2025-07-01 PROCEDURE — 81001 URINALYSIS AUTO W/SCOPE: CPT

## 2025-07-01 PROCEDURE — 84550 ASSAY OF BLOOD/URIC ACID: CPT

## 2025-07-01 PROCEDURE — 84156 ASSAY OF PROTEIN URINE: CPT

## 2025-07-01 PROCEDURE — 86900 BLOOD TYPING SEROLOGIC ABO: CPT

## 2025-07-01 PROCEDURE — 82570 ASSAY OF URINE CREATININE: CPT

## 2025-07-01 PROCEDURE — 86850 RBC ANTIBODY SCREEN: CPT

## 2025-07-01 PROCEDURE — 83615 LACTATE (LD) (LDH) ENZYME: CPT

## 2025-07-01 RX ORDER — SIMETHICONE 80 MG
80 TABLET,CHEWABLE ORAL EVERY 4 HOURS
Refills: 0 | Status: DISCONTINUED | OUTPATIENT
Start: 2025-07-01 | End: 2025-07-03

## 2025-07-01 RX ORDER — BENZOCAINE 220 MG/G
1 SPRAY, METERED PERIODONTAL EVERY 6 HOURS
Refills: 0 | Status: DISCONTINUED | OUTPATIENT
Start: 2025-07-01 | End: 2025-07-03

## 2025-07-01 RX ORDER — OXYTOCIN-SODIUM CHLORIDE 0.9% IV SOLN 30 UNIT/500ML 30-0.9/5 UT/ML-%
SOLUTION INTRAVENOUS
Qty: 30 | Refills: 0 | Status: DISCONTINUED | OUTPATIENT
Start: 2025-07-01 | End: 2025-07-02

## 2025-07-01 RX ORDER — KETOROLAC TROMETHAMINE 30 MG/ML
30 INJECTION, SOLUTION INTRAMUSCULAR; INTRAVENOUS ONCE
Refills: 0 | Status: DISCONTINUED | OUTPATIENT
Start: 2025-07-01 | End: 2025-07-01

## 2025-07-01 RX ORDER — MAGNESIUM HYDROXIDE 400 MG/5ML
30 SUSPENSION ORAL
Refills: 0 | Status: DISCONTINUED | OUTPATIENT
Start: 2025-07-01 | End: 2025-07-03

## 2025-07-01 RX ORDER — ACETAMINOPHEN 500 MG/5ML
975 LIQUID (ML) ORAL
Refills: 0 | Status: DISCONTINUED | OUTPATIENT
Start: 2025-07-01 | End: 2025-07-03

## 2025-07-01 RX ORDER — IBUPROFEN 200 MG
600 TABLET ORAL EVERY 6 HOURS
Refills: 0 | Status: COMPLETED | OUTPATIENT
Start: 2025-07-01 | End: 2026-05-30

## 2025-07-01 RX ORDER — PRENATAL 136/IRON/FOLIC ACID 27 MG-1 MG
1 TABLET ORAL DAILY
Refills: 0 | Status: DISCONTINUED | OUTPATIENT
Start: 2025-07-01 | End: 2025-07-03

## 2025-07-01 RX ORDER — OXYTOCIN-SODIUM CHLORIDE 0.9% IV SOLN 30 UNIT/500ML 30-0.9/5 UT/ML-%
167 SOLUTION INTRAVENOUS
Qty: 30 | Refills: 0 | Status: DISCONTINUED | OUTPATIENT
Start: 2025-07-01 | End: 2025-07-03

## 2025-07-01 RX ORDER — DIPHENHYDRAMINE HCL 12.5MG/5ML
25 ELIXIR ORAL EVERY 6 HOURS
Refills: 0 | Status: DISCONTINUED | OUTPATIENT
Start: 2025-07-01 | End: 2025-07-03

## 2025-07-01 RX ORDER — OXYCODONE HYDROCHLORIDE 30 MG/1
5 TABLET ORAL
Refills: 0 | Status: DISCONTINUED | OUTPATIENT
Start: 2025-07-01 | End: 2025-07-03

## 2025-07-01 RX ORDER — WITCH HAZEL LEAF
1 FLUID EXTRACT MISCELLANEOUS EVERY 4 HOURS
Refills: 0 | Status: DISCONTINUED | OUTPATIENT
Start: 2025-07-01 | End: 2025-07-03

## 2025-07-01 RX ORDER — MODIFIED LANOLIN 100 %
1 CREAM (GRAM) TOPICAL EVERY 6 HOURS
Refills: 0 | Status: DISCONTINUED | OUTPATIENT
Start: 2025-07-01 | End: 2025-07-03

## 2025-07-01 RX ORDER — PRAMOXINE HCL 1 %
1 GEL (GRAM) TOPICAL EVERY 4 HOURS
Refills: 0 | Status: DISCONTINUED | OUTPATIENT
Start: 2025-07-01 | End: 2025-07-03

## 2025-07-01 RX ORDER — SODIUM CHLORIDE 9 G/1000ML
1000 INJECTION, SOLUTION INTRAVENOUS ONCE
Refills: 0 | Status: COMPLETED | OUTPATIENT
Start: 2025-07-01 | End: 2025-07-01

## 2025-07-01 RX ORDER — HYDROCORTISONE 10 MG/G
1 CREAM TOPICAL EVERY 6 HOURS
Refills: 0 | Status: DISCONTINUED | OUTPATIENT
Start: 2025-07-01 | End: 2025-07-03

## 2025-07-01 RX ORDER — OXYCODONE HYDROCHLORIDE 30 MG/1
5 TABLET ORAL ONCE
Refills: 0 | Status: DISCONTINUED | OUTPATIENT
Start: 2025-07-01 | End: 2025-07-03

## 2025-07-01 RX ORDER — DIBUCAINE 10 MG/G
1 OINTMENT TOPICAL EVERY 6 HOURS
Refills: 0 | Status: DISCONTINUED | OUTPATIENT
Start: 2025-07-01 | End: 2025-07-03

## 2025-07-01 RX ADMIN — OXYTOCIN-SODIUM CHLORIDE 0.9% IV SOLN 30 UNIT/500ML 2 MILLIUNIT(S)/MIN: 30-0.9/5 SOLUTION at 06:50

## 2025-07-01 RX ADMIN — SODIUM CHLORIDE 1000 MILLILITER(S): 9 INJECTION, SOLUTION INTRAVENOUS at 01:23

## 2025-07-01 RX ADMIN — Medication 150 MICROGRAM(S): at 06:51

## 2025-07-01 NOTE — OB NEONATOLOGY/PEDIATRICIAN DELIVERY SUMMARY - NSPEDSNEONOTESA_OBGYN_ALL_OB_FT
Peds requested at bedside by OB team for cat II tracing. Baby boy born at 37 1/7 wks via  on 25 22:45 to a 25 y/o  blood type AB+ mother. Maternal history of gHTN and hypothyroidism on Synthroid. No significant prenatal history. PNL nr/immune/-, GBS - on . AROM at 10:11 with clear fluids. Baby emerged vigorous, crying, was w/d/s/s with APGARS of 8/9. Cord clamping delayed for 1 minute. Terminal mec noted. At 7 minute of life, baby noted to be nasal flaring and was given chest PT and CPAP 5 21% for 3 minutes.  Weaned to RA and tolerated well; lungs clear bilaterally, pink, and no accessory muscle use. Mom would like to breast and bottle feed, consents Hep B and consents circ. Tmax: 37.4 C, EOS 0.46.

## 2025-07-01 NOTE — OB PROVIDER LABOR PROGRESS NOTE - ASSESSMENT
25yo P0 @ 37w admitted for IOL for gHTN, vaginal cytotec and CB inserted without difficulty. VE 2/50/-3, Cat I tracing. Currently normotensive.  - cont present management  - cont CB, will transition to buccal cytotec in 3h  - cEFM/toco  - cont to monitor BPs    d/w Dr. Marie
23yo P0 at 37wks, iol gHTN. GBS NEG  s/p cervical balloon and cytotec  on pitocin  arom clear  peanut bAll  anticipate   maternal and fetal status overall reassuring  h nixon BOB
st pit at 630 am making cervical change.    Isabela PGY4

## 2025-07-01 NOTE — OB PROVIDER LABOR PROGRESS NOTE - NS_SUBJECTIVE/OBJECTIVE_OBGYN_ALL_OB_FT
Patient seen for CE, CB out making cervical change
At bedside for placement of vaginal cytotec and CB. Pt without complaints.     Vital Signs Last 24 Hrs  T(C): 36.7 (30 Jun 2025 22:16), Max: 37.1 (30 Jun 2025 19:23)  T(F): 98.06 (30 Jun 2025 21:07), Max: 98.8 (30 Jun 2025 19:33)  HR: 100 (30 Jun 2025 22:29) (72 - 100)  BP: 122/72 (30 Jun 2025 22:26) (101/58 - 155/83)  RR: 18 (30 Jun 2025 22:16) (14 - 18)  SpO2: 92% (30 Jun 2025 22:29) (84% - 100%)    VE: 2/50/-3, vaginal cytotec #1 inserted, Cook balloon inserted without difficulty, inflated 80/40. Taped to pt's leg. Pt tolerated well.
late entry due to clincial care

## 2025-07-02 ENCOUNTER — APPOINTMENT (OUTPATIENT)
Dept: OBGYN | Facility: CLINIC | Age: 25
End: 2025-07-02

## 2025-07-02 PROCEDURE — 80053 COMPREHEN METABOLIC PANEL: CPT

## 2025-07-02 PROCEDURE — 86901 BLOOD TYPING SEROLOGIC RH(D): CPT

## 2025-07-02 PROCEDURE — 84156 ASSAY OF PROTEIN URINE: CPT

## 2025-07-02 PROCEDURE — 85025 COMPLETE CBC W/AUTO DIFF WBC: CPT

## 2025-07-02 PROCEDURE — 81001 URINALYSIS AUTO W/SCOPE: CPT

## 2025-07-02 PROCEDURE — 83615 LACTATE (LD) (LDH) ENZYME: CPT

## 2025-07-02 PROCEDURE — 84550 ASSAY OF BLOOD/URIC ACID: CPT

## 2025-07-02 PROCEDURE — 86850 RBC ANTIBODY SCREEN: CPT

## 2025-07-02 PROCEDURE — 86900 BLOOD TYPING SEROLOGIC ABO: CPT

## 2025-07-02 PROCEDURE — 82570 ASSAY OF URINE CREATININE: CPT

## 2025-07-02 PROCEDURE — 59050 FETAL MONITOR W/REPORT: CPT

## 2025-07-02 PROCEDURE — 86780 TREPONEMA PALLIDUM: CPT

## 2025-07-02 RX ORDER — IBUPROFEN 200 MG
600 TABLET ORAL EVERY 6 HOURS
Refills: 0 | Status: DISCONTINUED | OUTPATIENT
Start: 2025-07-02 | End: 2025-07-03

## 2025-07-02 RX ADMIN — Medication 600 MILLIGRAM(S): at 05:49

## 2025-07-02 RX ADMIN — Medication 975 MILLIGRAM(S): at 04:00

## 2025-07-02 RX ADMIN — Medication 1 TABLET(S): at 12:38

## 2025-07-02 RX ADMIN — Medication 600 MILLIGRAM(S): at 13:37

## 2025-07-02 RX ADMIN — Medication 975 MILLIGRAM(S): at 08:57

## 2025-07-02 RX ADMIN — Medication 975 MILLIGRAM(S): at 03:04

## 2025-07-02 RX ADMIN — Medication 975 MILLIGRAM(S): at 21:23

## 2025-07-02 RX ADMIN — Medication 150 MICROGRAM(S): at 05:49

## 2025-07-02 RX ADMIN — Medication 975 MILLIGRAM(S): at 09:57

## 2025-07-02 RX ADMIN — Medication 600 MILLIGRAM(S): at 18:49

## 2025-07-02 RX ADMIN — Medication 600 MILLIGRAM(S): at 17:49

## 2025-07-02 RX ADMIN — Medication 600 MILLIGRAM(S): at 12:37

## 2025-07-02 RX ADMIN — Medication 3 MILLILITER(S): at 14:02

## 2025-07-02 RX ADMIN — Medication 975 MILLIGRAM(S): at 14:48

## 2025-07-02 RX ADMIN — Medication 975 MILLIGRAM(S): at 15:48

## 2025-07-02 NOTE — OB RN DELIVERY SUMMARY - NSSELHIDDEN_OBGYN_ALL_OB_FT
[NS_DeliveryAttending1_OBGYN_ALL_OB_FT:[id number]],[NS_DeliveryRN_OBGYN_ALL_OB_FT:[id number]=]

## 2025-07-02 NOTE — OB RN DELIVERY SUMMARY - NS_NEWBORNRN2_OBGYN_ALL_OB_FT
DANY- SARAH Parker RN

## 2025-07-02 NOTE — OB RN DELIVERY SUMMARY - NS_SEPSISRSKCALC_OBGYN_ALL_OB_FT
EOS calculated successfully. EOS Risk Factor: 0.5/1000 live births ([facility] national incidence); GA=37w1d; Temp=98.8; ROM=12.567; GBS='Negative'; Antibiotics='No antibiotics or any antibiotics < 2 hrs prior to birth'

## 2025-07-02 NOTE — OB PROVIDER DELIVERY SUMMARY - NSPROVIDERDELIVERYNOTE_OBGYN_ALL_OB_FT
patient pushed to deliver liveborn male infant. head, shoulders, body delivered with ease. terminal meconium noted. infant placed on mother's abdomen, spontaneous cry and dried/stimulated/suctioned. delayed cord clamping. cord then clamped and cut. placenta delivered spontaneously, intact. IV pitocin per protocol. 2nd degree laceration repaired in usual fashion with 2-0 vicryl rapide. hemostatic at end of repair.  counts correct.  mother and baby stable in delivery room.

## 2025-07-03 ENCOUNTER — TRANSCRIPTION ENCOUNTER (OUTPATIENT)
Age: 25
End: 2025-07-03

## 2025-07-03 VITALS
SYSTOLIC BLOOD PRESSURE: 116 MMHG | DIASTOLIC BLOOD PRESSURE: 82 MMHG | RESPIRATION RATE: 18 BRPM | TEMPERATURE: 98 F | OXYGEN SATURATION: 100 % | HEART RATE: 82 BPM

## 2025-07-03 PROBLEM — E03.9 HYPOTHYROIDISM, UNSPECIFIED: Chronic | Status: ACTIVE | Noted: 2025-06-30

## 2025-07-03 PROCEDURE — 83615 LACTATE (LD) (LDH) ENZYME: CPT

## 2025-07-03 PROCEDURE — 59050 FETAL MONITOR W/REPORT: CPT

## 2025-07-03 PROCEDURE — 84156 ASSAY OF PROTEIN URINE: CPT

## 2025-07-03 PROCEDURE — 80053 COMPREHEN METABOLIC PANEL: CPT

## 2025-07-03 PROCEDURE — 86901 BLOOD TYPING SEROLOGIC RH(D): CPT

## 2025-07-03 PROCEDURE — 85025 COMPLETE CBC W/AUTO DIFF WBC: CPT

## 2025-07-03 PROCEDURE — 86850 RBC ANTIBODY SCREEN: CPT

## 2025-07-03 PROCEDURE — 86900 BLOOD TYPING SEROLOGIC ABO: CPT

## 2025-07-03 PROCEDURE — 86780 TREPONEMA PALLIDUM: CPT

## 2025-07-03 PROCEDURE — 81001 URINALYSIS AUTO W/SCOPE: CPT

## 2025-07-03 PROCEDURE — 84550 ASSAY OF BLOOD/URIC ACID: CPT

## 2025-07-03 PROCEDURE — 82570 ASSAY OF URINE CREATININE: CPT

## 2025-07-03 RX ORDER — MODIFIED LANOLIN 100 %
1 CREAM (GRAM) TOPICAL
Qty: 0 | Refills: 0 | DISCHARGE
Start: 2025-07-03

## 2025-07-03 RX ORDER — DIBUCAINE 10 MG/G
1 OINTMENT TOPICAL
Qty: 0 | Refills: 0 | DISCHARGE
Start: 2025-07-03

## 2025-07-03 RX ORDER — WITCH HAZEL LEAF
1 FLUID EXTRACT MISCELLANEOUS
Qty: 0 | Refills: 0 | DISCHARGE
Start: 2025-07-03

## 2025-07-03 RX ORDER — IBUPROFEN 200 MG
1 TABLET ORAL
Qty: 0 | Refills: 0 | DISCHARGE
Start: 2025-07-03

## 2025-07-03 RX ORDER — PRENATAL 136/IRON/FOLIC ACID 27 MG-1 MG
1 TABLET ORAL
Qty: 0 | Refills: 0 | DISCHARGE
Start: 2025-07-03

## 2025-07-03 RX ORDER — PRAMOXINE HCL 1 %
1 GEL (GRAM) TOPICAL
Qty: 0 | Refills: 0 | DISCHARGE
Start: 2025-07-03

## 2025-07-03 RX ORDER — HYDROCORTISONE 10 MG/G
1 CREAM TOPICAL
Qty: 0 | Refills: 0 | DISCHARGE
Start: 2025-07-03

## 2025-07-03 RX ORDER — BENZOCAINE 220 MG/G
1 SPRAY, METERED PERIODONTAL
Qty: 0 | Refills: 0 | DISCHARGE
Start: 2025-07-03

## 2025-07-03 RX ORDER — LEVOTHYROXINE SODIUM 300 MCG
1 TABLET ORAL
Qty: 0 | Refills: 0 | DISCHARGE
Start: 2025-07-03

## 2025-07-03 RX ORDER — SIMETHICONE 80 MG
1 TABLET,CHEWABLE ORAL
Qty: 0 | Refills: 0 | DISCHARGE
Start: 2025-07-03

## 2025-07-03 RX ORDER — ACETAMINOPHEN 500 MG/5ML
3 LIQUID (ML) ORAL
Qty: 0 | Refills: 0 | DISCHARGE
Start: 2025-07-03

## 2025-07-03 RX ADMIN — Medication 150 MICROGRAM(S): at 06:13

## 2025-07-03 RX ADMIN — Medication 975 MILLIGRAM(S): at 02:27

## 2025-07-03 RX ADMIN — Medication 1 TABLET(S): at 12:04

## 2025-07-03 RX ADMIN — Medication 600 MILLIGRAM(S): at 06:14

## 2025-07-03 NOTE — DISCHARGE NOTE OB - MEDICATION SUMMARY - MEDICATIONS TO TAKE
I will START or STAY ON the medications listed below when I get home from the hospital:    ibuprofen 600 mg oral tablet  -- 1 tab(s) by mouth every 6 hours  -- Indication: For  (normal spontaneous vaginal delivery)    acetaminophen 325 mg oral tablet  -- 3 tab(s) by mouth every 6 hours  -- Indication: For  (normal spontaneous vaginal delivery)    benzocaine 20% topical spray  -- 1 Apply on skin to affected area every 6 hours As needed for Perineal discomfort  -- Indication: For  (normal spontaneous vaginal delivery)    dibucaine 1% topical ointment  -- 1 Apply on skin to affected area every 6 hours As needed Perineal discomfort  -- Indication: For  (normal spontaneous vaginal delivery)    pramoxine 1% topical cream  -- 1 Apply on skin to affected area every 4 hours As needed Moderate Pain (4-6)  -- Indication: For  (normal spontaneous vaginal delivery)    hydrocortisone 1% topical cream  -- 1 Apply on skin to affected area every 6 hours As needed Moderate Pain (4-6)  -- Indication: For  (normal spontaneous vaginal delivery)    lanolin topical ointment  -- 1 Apply on skin to affected area every 6 hours As needed nipple soreness  -- Indication: For  (normal spontaneous vaginal delivery)    witch hazel 50% topical pad  -- 1 Apply on skin to affected area every 4 hours As needed Perineal discomfort  -- Indication: For  (normal spontaneous vaginal delivery)    Prenatal Multivitamins with Folic Acid 1 mg oral tablet  -- 1 tab(s) by mouth once a day  -- Indication: For  (normal spontaneous vaginal delivery)    simethicone 80 mg oral tablet, chewable  -- 1 tab(s) by mouth every 4 hours As needed Gas  -- Indication: For  (normal spontaneous vaginal delivery)    levothyroxine 150 mcg (0.15 mg) oral tablet  -- 1 tab(s) by mouth once a day  -- Indication: For Hypothyroidism

## 2025-07-03 NOTE — DISCHARGE NOTE OB - HOSPITAL COURSE
uncomplicated vaginal delivery. gestational hypertension but diagnosed with normal blood pressures. stable to dc home on ppd2 in stable condition. to f/u in office on Monday for BP check.

## 2025-07-03 NOTE — DISCHARGE NOTE OB - PATIENT PORTAL LINK FT
You can access the FollowMyHealth Patient Portal offered by [facility] by registering at the following website: [email or web address]. By joining [facility]’s FollowMyHealth portal, you will also be able to view your health information using other applications (apps) compatible with our system.

## 2025-07-03 NOTE — DISCHARGE NOTE OB - MATERIALS PROVIDED
Vaccinations/Breastfeeding Log/Bottle Feeding Log/Breastfeeding Mother’s Support Group Information/Guide to Postpartum Care/[place] Hearing Screen Program/Back To Sleep Handout/Breastfeeding Guide and Packet

## 2025-07-03 NOTE — DISCHARGE NOTE OB - CARE PLAN
TACHYCARDIC Principal Discharge DX:	 (normal spontaneous vaginal delivery)  Assessment and plan of treatment:	Call your doctor for fevers, chills, nausea, vomiting, headaches that persist, blurry vision, heavy vaginal bleeding, pain that does not improve with medication. No heavy lifting, nothing in the vagina, no submerging your bottom in water.  Secondary Diagnosis:	Gestational hypertension  Assessment and plan of treatment:	Call your doctor for headaches that persist, blurry vision, BPs 140/90.   1

## 2025-07-03 NOTE — DISCHARGE NOTE OB - FINANCIAL ASSISTANCE
[facility] provides services at a reduced cost to those who are determined to be eligible through [facility]’s financial assistance program. Information regarding [facility]’s financial assistance program can be found by going to [email or web address] or by calling [phone].

## 2025-07-03 NOTE — DISCHARGE NOTE OB - CARE PROVIDER_API CALL
Zaida Mendez  Obstetrics & Gynecology  [address] [place] [address], [address]  [place] [address]  Phone: [phone]  Fax: [phone]  Follow Up Time: 1-3 days

## 2025-07-03 NOTE — PROGRESS NOTE ADULT - ASSESSMENT
A/P:  24y  PPD # 1   S/P     Doing Well    PMHx: none  Current Issues: none     A/P:  24y  PPD # 1   S/P     Doing Well    PMHx: none  Current Issues: gHTN

## 2025-07-03 NOTE — DISCHARGE NOTE OB - PLAN OF CARE
Call your doctor for fevers, chills, nausea, vomiting, headaches that persist, blurry vision, heavy vaginal bleeding, pain that does not improve with medication. No heavy lifting, nothing in the vagina, no submerging your bottom in water. Call your doctor for headaches that persist, blurry vision, BPs 140/90.

## 2025-07-03 NOTE — PROGRESS NOTE ADULT - SUBJECTIVE AND OBJECTIVE BOX
Postpartum Note- PPD#1    Prenatal Labs  Blood type: AB Positive  Rubella IgG: Immune  RPR: Negative        S:Patient w/o complaints, pain is controlled.    Pt is OOB, tolerating PO, voiding. Lochia WNL.     O:  Vital Signs Last 24 Hrs  T(C): 36.4 (03 Jul 2025 05:17), Max: 36.9 (02 Jul 2025 08:41)  T(F): 97.5 (03 Jul 2025 05:17), Max: 98.4 (02 Jul 2025 08:41)  HR: 82 (03 Jul 2025 05:17) (67 - 82)  BP: 116/82 (03 Jul 2025 05:17) (93/63 - 116/82)  BP(mean): --  RR: 18 (03 Jul 2025 05:17) (18 - 18)  SpO2: 100% (03 Jul 2025 05:17) (99% - 100%)    Parameters below as of 03 Jul 2025 05:17  Patient On (Oxygen Delivery Method): room air         Gen: NAD  Abdomen: Soft, nontender, non-distended, fundus firm.  Vaginal: Lochia WNL,    Ext:  Neg calf tenderness    LABS:                  
PA PPD#1  Note    Blood Type:   A             Rubella: Immune              RPR:  NR  Pain:  Controlled  Complaints:  None  Pt. is OOB, voiding, passing flatus, & tolerating regular diet.  Lochia:  WNL    VS:  Vital Signs Last 24 Hrs  T(C): 36.7 (2025 05:25), Max: 37.3 (2025 01:39)  T(F): 98.1 (2025 05:25), Max: 99.1 (2025 01:39)  HR: 82 (2025 05:25) (70 - 148)  BP: 103/67 (2025 05:25) (92/55 - 158/69)  BP(mean): 89 (2025 02:38) (73 - 99)  RR: 18 (2025 05:25) (16 - 18)  SpO2: 99% (2025 05:25) (74% - 100%)    Parameters below as of 2025 05:25  Patient On (Oxygen Delivery Method): room air                       13.6   9.58  )-----------( 231      ( 2025 16:33 )             40.8     Abd:  Soft, non-distended, non-tender            Fundus-firm  Laceration-2nd Degree: C/D/I  Extremities:  Edema - none                    Calf Tenderness - none    Assessment:    24 y.o. G1  P  1001     PPD # 1 S/P  in stable condition.  PMHx significant for:  Hypothyroidism, Gest. HTN  Current Issues:  None  Plan:  Increase OOB             Cont. Pain Protocol             Cont. Reg. Diet             Cont. PP Carlotta.    DANIEL Valladares

## 2025-07-03 NOTE — PROGRESS NOTE ADULT - PROBLEM SELECTOR PLAN 1
Cont. PP Care
Increase OOB  Regular diet  PO Pain protocol  Routine Postpartum Care    Patricia Anguiano PA-C

## 2025-07-10 ENCOUNTER — APPOINTMENT (OUTPATIENT)
Dept: OBGYN | Facility: CLINIC | Age: 25
End: 2025-07-10

## 2025-07-16 ENCOUNTER — APPOINTMENT (OUTPATIENT)
Dept: OBGYN | Facility: CLINIC | Age: 25
End: 2025-07-16

## 2025-07-21 ENCOUNTER — APPOINTMENT (OUTPATIENT)
Dept: OBGYN | Facility: CLINIC | Age: 25
End: 2025-07-21
Payer: COMMERCIAL

## 2025-07-21 VITALS
BODY MASS INDEX: 34.41 KG/M2 | SYSTOLIC BLOOD PRESSURE: 115 MMHG | DIASTOLIC BLOOD PRESSURE: 82 MMHG | WEIGHT: 187 LBS | HEIGHT: 62 IN

## 2025-07-21 DIAGNOSIS — M53.3 SACROCOCCYGEAL DISORDERS, NOT ELSEWHERE CLASSIFIED: ICD-10-CM

## 2025-07-21 PROCEDURE — 0503F POSTPARTUM CARE VISIT: CPT

## 2025-07-22 ENCOUNTER — APPOINTMENT (OUTPATIENT)
Dept: OBGYN | Facility: CLINIC | Age: 25
End: 2025-07-22

## 2025-08-20 ENCOUNTER — APPOINTMENT (OUTPATIENT)
Dept: OBGYN | Facility: CLINIC | Age: 25
End: 2025-08-20
Payer: COMMERCIAL

## 2025-08-20 VITALS
WEIGHT: 187 LBS | DIASTOLIC BLOOD PRESSURE: 83 MMHG | HEIGHT: 62 IN | BODY MASS INDEX: 34.41 KG/M2 | SYSTOLIC BLOOD PRESSURE: 118 MMHG

## 2025-08-20 PROCEDURE — 0503F POSTPARTUM CARE VISIT: CPT

## 2025-08-21 RX ORDER — NORETHINDRONE ACETATE AND ETHINYL ESTRADIOL AND FERROUS FUMARATE 1MG-20(21)
1-20 KIT ORAL
Qty: 84 | Refills: 3 | Status: ACTIVE | COMMUNITY
Start: 2025-08-21 | End: 1900-01-01